# Patient Record
(demographics unavailable — no encounter records)

---

## 2024-10-23 NOTE — HISTORY OF PRESENT ILLNESS
[Mother] : mother [Yes] : Patient goes to dentist yearly [Toothpaste] : Primary Fluoride Source: Toothpaste [Up to date] : Up to date [Premenarche] : premenarche [Eats meals with family] : eats meals with family [Has family members/adults to turn to for help] : has family members/adults to turn to for help [Is permitted and is able to make independent decisions] : Is permitted and is able to make independent decisions [Grade: ____] : Grade: [unfilled] [Normal Performance] : normal performance [Eats regular meals including adequate fruits and vegetables] : eats regular meals including adequate fruits and vegetables [Drinks non-sweetened liquids] : drinks non-sweetened liquids  [Has friends] : has friends [At least 1 hour of physical activity a day] : at least 1 hour of physical activity a day [Uses safety belts/safety equipment] : uses safety belts/safety equipment  [No] : Patient has not had sexual intercourse [Has ways to cope with stress] : has ways to cope with stress [Displays self-confidence] : displays self-confidence [NO] : No [Uses electronic nicotine delivery system] : does not use electronic nicotine delivery system [Exposure to electronic nicotine delivery system] : no exposure to electronic nicotine delivery system [Uses tobacco] : does not use tobacco [Exposure to tobacco] : no exposure to tobacco [Uses drugs] : does not use drugs  [Exposure to drugs] : no exposure to drugs [Drinks alcohol] : does not drink alcohol [Exposure to alcohol] : no exposure to alcohol [FreeTextEntry7] : 12 YO WELL EXAM [de-identified] : NEW PT TO PRACTICE  [FreeTextEntry1] : ALL PMH ISSUES  W UROLOGY / GASTRO HAVE RESOLVED NO CURRENT ISSUES DOING WELL

## 2024-10-23 NOTE — PHYSICAL EXAM

## 2024-10-23 NOTE — DISCUSSION/SUMMARY
[Normal Growth] : growth [Normal Development] : development  [No Elimination Concerns] : elimination [Continue Regimen] : feeding [No Skin Concerns] : skin [Normal Sleep Pattern] : sleep [None] : no medical problems [Anticipatory Guidance Given] : Anticipatory guidance addressed as per the history of present illness section [Physical Growth and Development] : physical growth and development [Social and Academic Competence] : social and academic competence [Emotional Well-Being] : emotional well-being [Risk Reduction] : risk reduction [Violence and Injury Prevention] : violence and injury prevention [No Vaccines] : no vaccines needed [No Medications] : ~He/She~ is not on any medications [Patient] : patient [Parent/Guardian] : Parent/Guardian [Full Activity without restrictions including Physical Education & Athletics] : Full Activity without restrictions including Physical Education & Athletics [] : The components of the vaccine(s) to be administered today are listed in the plan of care. The disease(s) for which the vaccine(s) are intended to prevent and the risks have been discussed with the caretaker.  The risks are also included in the appropriate vaccination information statements which have been provided to the patient's caregiver.  The caregiver has given consent to vaccinate. [FreeTextEntry1] : ADACEL, MENACTRA AND FLU GIVEN TODAY Provided counseling on the diseases to be vaccinated against as well as the risks/benefits of providing and withholding recommended vaccines to be given today to SHAINA .All questions were answered and the parent verbalized understanding.   CBC LIPID SENT TO LAB  UA IN OFFICE  HEARING WNL  VISION SEES OPTHO  TB risk assessment completed- no risk for TB. PPD not required  Discussed safety/feeding/sleep as appropriate for age.   All old records, PMH, medication, allergies, and immunizations reviewed and uploaded to new system.  Time allowed for questions and all answered with understanding.

## 2025-07-17 NOTE — HISTORY OF PRESENT ILLNESS
[de-identified] : chest pain [FreeTextEntry6] : c/o chest pain both at rest and at play feels like her heart is racing yesterday during game she was completely pale and fatigue Pt was eval by cardiologist 2020 consult reviewed:   SHAINA has a normal cardiac exam, electrocardiogram and echocardiogram. I explained to SHAINA's mother that while it is unlikely that these episodes are related to a tachyarrhythmia, I will order a 30 day loop recorder in the hope of capturing one of the described events. Additionally, I reassured SHAINA and her family that JEFFs heart is structurally and functionally normal. Caffeine and over the counter cold medications should be avoided. All physical activities may be performed without restriction. Follow-up will be arranged over the phone pending the results of the monitor.    SBE Prophylaxis: SHAINA does not need bacterial endocarditis prophylaxis.    Guidelines for Physical Activity in School: SHAINA may participate in the entire physical education program without restriction, including all varsity competitive sports.  Pt NEVER did 30 day holter monitor  sx are on going no weight loss or night sweats, otherwise healthy and active plays travel soccer

## 2025-07-17 NOTE — DISCUSSION/SUMMARY
[FreeTextEntry1] :  LABS SENT OUT F/U W CARDIOLGOY    Total time spent today included reviewing diagnosis and treatment plan/monitoring, review of prior notes, review of medications and monitoring for side effects, review of last labs with patient/family, plan for continued symptoms and laboratory monitoring as ordered and time spend with patient/parent. Reviewed recent chart notes from other providers and medical records as well.  Will plan for follow up in approx. 1 month, family to be in touch sooner as needed for worsening or new concerning symptoms.

## 2025-07-22 NOTE — CARDIOLOGY SUMMARY
[Today's Date] : [unfilled] [FreeTextEntry1] : Normal sinus rhythm without preexcitation or ectopy. Heart rate (bpm): 67  [FreeTextEntry2] : 1. Normal left ventricular size, morphology and systolic function. 2. Normal right ventricular morphology with qualitatively normal size and systolic function. 3. No pericardial effusion.  [de-identified] : orthostatics  [de-identified] : lying 97/68 hr 84 standing 3min 97/62 hr 83 standing 5min 90/59 hr 80

## 2025-07-22 NOTE — DISCUSSION/SUMMARY
[FreeTextEntry1] : SHAINA has a normal cardiac exam, electrocardiogram and echocardiogram.  I reassured the patient and her  family that SHAINA's heart is structurally and functionally normal without any evidence of a cardiac abnormality.  I feel that her symptoms are related to anxiety and not a cardiac abnormality.  The importance of significantly increasing hydration with the goal of producing dilute urine was emphasized .  All physical activities may be performed without restriction and there is no need for routine follow-up unless future concerns arise.    [Needs SBE Prophylaxis] : [unfilled] does not need bacterial endocarditis prophylaxis [PE + No Restrictions] : [unfilled] may participate in the entire physical education program without restriction, including all varsity competitive sports.

## 2025-07-22 NOTE — REVIEW OF SYSTEMS
[Shortness Of Breath] : expressed as feeling short of breath [Fainting (Syncope)] : fainting [Feeling Poorly] : not feeling poorly (malaise) [Fever] : no fever [Wgt Loss (___ Lbs)] : no recent weight loss [Pallor] : not pale [Eye Discharge] : no eye discharge [Redness] : no redness [Change in Vision] : no change in vision [Nasal Stuffiness] : no nasal congestion [Sore Throat] : no sore throat [Earache] : no earache [Loss Of Hearing] : no hearing loss [Cyanosis] : no cyanosis [Edema] : no edema [Diaphoresis] : not diaphoretic [Chest Pain] : no chest pain or discomfort [Exercise Intolerance] : no persistence of exercise intolerance [Palpitations] : no palpitations [Orthopnea] : no orthopnea [Fast HR] : no tachycardia [Tachypnea] : not tachypneic [Wheezing] : no wheezing [Cough] : no cough [Vomiting] : no vomiting [Diarrhea] : no diarrhea [Abdominal Pain] : no abdominal pain [Decrease In Appetite] : appetite not decreased [Seizure] : no seizures [Headache] : no headache [Dizziness] : no dizziness [Limping] : no limping [Joint Pains] : no arthralgias [Joint Swelling] : no joint swelling [Rash] : no rash [Wound problems] : no wound problems [Easy Bruising] : no tendency for easy bruising [Swollen Glands] : no lymphadenopathy [Easy Bleeding] : no ~M tendency for easy bleeding [Nosebleeds] : no epistaxis [Sleep Disturbances] : ~T no sleep disturbances [Hyperactive] : no hyperactive behavior [Depression] : no depression [Anxiety] : no anxiety [Failure To Thrive] : no failure to thrive [Short Stature] : short stature was not noted [Jitteriness] : no jitteriness [Heat/Cold Intolerance] : no temperature intolerance [Dec Urine Output] : no oliguria

## 2025-07-22 NOTE — HISTORY OF PRESENT ILLNESS
[FreeTextEntry1] :  Pham  a 12-year-old female, presents with concerns of intermittent breathing difficulties and occasional dizziness. The patient reports experiencing episodes where she feels she has to take three big breaths to catch her breath, occurring a couple of times a day. These episodes happen mostly when she's at rest, such as sitting on the couch. After taking three deep breaths, she feels fine, but the episodes may recur about five hours later. The patient denies any associated pain or noticeable heart palpitations during these episodes. She also reports occasional dizziness and lightheadedness, which resolves quickly. The dizziness is more noticeable when changing positions quickly, such as getting up after falling during soccer. The patient is an active , training daily for about 1.5 hours. She doesn't notice significant breathing issues during soccer, though sometimes experiences them but attributes it to running. The patient's mother reports that these episodes have been occurring for some time, with Pham occasionally complaining of sudden inability to breathe even while watching TV. SHAINA's mother states that SHAINA does get anxious and reports that "she overthinks things".

## 2025-07-22 NOTE — CARDIOLOGY SUMMARY
[Today's Date] : [unfilled] [FreeTextEntry1] : Normal sinus rhythm without preexcitation or ectopy. Heart rate (bpm): 67  [FreeTextEntry2] : 1. Normal left ventricular size, morphology and systolic function. 2. Normal right ventricular morphology with qualitatively normal size and systolic function. 3. No pericardial effusion.  [de-identified] : orthostatics  [de-identified] : lying 97/68 hr 84 standing 3min 97/62 hr 83 standing 5min 90/59 hr 80

## 2025-07-22 NOTE — REASON FOR VISIT
[Follow-Up] : a follow-up visit for [Dizziness/Lightheadedness] : dizziness/lightheadedness [Shortness Of Breath] : shortness of breath [Patient] : patient [Mother] : mother [Medical Records] : medical records

## 2025-07-22 NOTE — CONSULT LETTER
[Today's Date] : [unfilled] [Name] : Name: [unfilled] [] : : ~~ [Today's Date:] : [unfilled] [Dear  ___:] : Dear Dr. [unfilled]: [Consult] : I had the pleasure of evaluating your patient, [unfilled]. My full evaluation follows. [Consult - Single Provider] : Thank you very much for allowing me to participate in the care of this patient. If you have any questions, please do not hesitate to contact me. [Sincerely,] : Sincerely, [FreeTextEntry4] : ELIAS SANFORD MD [de-identified] : Emilia Menjivar MD, FAAP, FACC  Pediatric Cardiologist  of Pediatrics [facility]